# Patient Record
Sex: MALE | Race: WHITE | URBAN - METROPOLITAN AREA
[De-identification: names, ages, dates, MRNs, and addresses within clinical notes are randomized per-mention and may not be internally consistent; named-entity substitution may affect disease eponyms.]

---

## 2020-08-05 ENCOUNTER — EMERGENCY (EMERGENCY)
Facility: HOSPITAL | Age: 40
LOS: 1 days | Discharge: HOME | End: 2020-08-05
Attending: EMERGENCY MEDICINE
Payer: COMMERCIAL

## 2020-08-05 VITALS
TEMPERATURE: 97 F | DIASTOLIC BLOOD PRESSURE: 80 MMHG | WEIGHT: 184.97 LBS | OXYGEN SATURATION: 97 % | HEART RATE: 65 BPM | HEIGHT: 71 IN | SYSTOLIC BLOOD PRESSURE: 131 MMHG | RESPIRATION RATE: 18 BRPM

## 2020-08-05 PROCEDURE — 99283 EMERGENCY DEPT VISIT LOW MDM: CPT | Mod: 25

## 2020-08-05 PROCEDURE — 12011 RPR F/E/E/N/L/M 2.5 CM/<: CPT

## 2020-08-05 RX ORDER — CYCLOBENZAPRINE HYDROCHLORIDE 10 MG/1
1 TABLET, FILM COATED ORAL
Qty: 15 | Refills: 0
Start: 2020-08-05 | End: 2020-08-09

## 2020-08-05 RX ORDER — IBUPROFEN 200 MG
1 TABLET ORAL
Qty: 21 | Refills: 0
Start: 2020-08-05 | End: 2020-08-11

## 2020-08-05 RX ORDER — IBUPROFEN 200 MG
600 TABLET ORAL ONCE
Refills: 0 | Status: COMPLETED | OUTPATIENT
Start: 2020-08-05 | End: 2020-08-05

## 2020-08-05 RX ADMIN — Medication 600 MILLIGRAM(S): at 18:38

## 2020-08-05 RX ADMIN — Medication 600 MILLIGRAM(S): at 18:00

## 2020-08-05 NOTE — ED ADULT TRIAGE NOTE - PATIENT ON (OXYGEN DELIVERY METHOD)
room air Island Pedicle Flap With Canthal Suspension Text: The defect edges were debeveled with a #15 scalpel blade.  Given the location of the defect, shape of the defect and the proximity to free margins an island pedicle advancement flap was deemed most appropriate.  Using a sterile surgical marker, an appropriate advancement flap was drawn incorporating the defect, outlining the appropriate donor tissue and placing the expected incisions within the relaxed skin tension lines where possible. The area thus outlined was incised deep to adipose tissue with a #15 scalpel blade.  The skin margins were undermined to an appropriate distance in all directions around the primary defect and laterally outward around the island pedicle utilizing iris scissors.  There was minimal undermining beneath the pedicle flap. A suspension suture was placed in the canthal tendon to prevent tension and prevent ectropion.

## 2020-08-05 NOTE — ED PROVIDER NOTE - ATTENDING CONTRIBUTION TO CARE
pt comes in c/o laceration to right face and lower back pain   1 cm laceration actively bleeding   + paraspinal tenderness , no midline   no saddle anesthesia     laceration repair and    analgesia

## 2020-08-05 NOTE — ED PROVIDER NOTE - PHYSICAL EXAMINATION
R malar area 1cm linear laceration actively bleeding. No foreign body noted. Lower back has no midline tenderness and no stepoffs. +Paraspinal tenderness b/l.
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
## 2020-08-05 NOTE — ED PROVIDER NOTE - PATIENT PORTAL LINK FT
You can access the FollowMyHealth Patient Portal offered by [facility] by registering at the following website: [email or web address]. By joining [employer]’s FollowMyHealth portal, you will also be able to view your health information using other applications (apps) compatible with our system.

## 2020-08-05 NOTE — ED PROVIDER NOTE - CLINICAL SUMMARY MEDICAL DECISION MAKING FREE TEXT BOX
Tetanus UTD. Will suture and will give analgesia for back pain, return in 5 days for suture removal.
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
## 2020-08-05 NOTE — ED PROVIDER NOTE - PROGRESS NOTE DETAILS
Will return in 5 days for suture removal. Pt is well appearing walking with steady gait, stable for discharge and follow up without fail with medical doctor. I had a detailed discussion with the patient and/or guardian regarding the historical points, exam findings, and any diagnostic results supporting the discharge diagnosis. Pt educated on care and need for follow up. Strict return instructions and red flag signs and symptoms discussed with patient. Questions answered. Pt shows understanding of discharge information and agrees to follow.
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
## 2021-10-28 ENCOUNTER — EMERGENCY (EMERGENCY)
Facility: HOSPITAL | Age: 41
LOS: 1 days | Discharge: HOME | End: 2021-10-28
Attending: EMERGENCY MEDICINE
Payer: COMMERCIAL

## 2021-10-28 VITALS
OXYGEN SATURATION: 98 % | HEIGHT: 71 IN | SYSTOLIC BLOOD PRESSURE: 134 MMHG | DIASTOLIC BLOOD PRESSURE: 82 MMHG | HEART RATE: 68 BPM | RESPIRATION RATE: 18 BRPM | TEMPERATURE: 98 F

## 2021-10-28 PROCEDURE — 73630 X-RAY EXAM OF FOOT: CPT | Mod: 26,RT

## 2021-10-28 PROCEDURE — 99284 EMERGENCY DEPT VISIT MOD MDM: CPT | Mod: 25

## 2021-10-28 PROCEDURE — 73030 X-RAY EXAM OF SHOULDER: CPT

## 2021-10-28 PROCEDURE — 99284 EMERGENCY DEPT VISIT MOD MDM: CPT

## 2021-10-28 PROCEDURE — 73030 X-RAY EXAM OF SHOULDER: CPT | Mod: 26,RT

## 2021-10-28 PROCEDURE — 73630 X-RAY EXAM OF FOOT: CPT

## 2021-10-28 RX ORDER — IBUPROFEN 200 MG
1 TABLET ORAL
Qty: 28 | Refills: 0
Start: 2021-10-28 | End: 2021-11-03

## 2021-10-28 NOTE — ED PROVIDER NOTE - NS ED ROS FT
CONSTITUTIONAL: No weakness, fevers or chills  EYES/ENT: No visual changes;  No vertigo or throat pain   NECK: No pain or stiffness  RESPIRATORY: No cough, wheezing, hemoptysis; No shortness of breath  CARDIOVASCULAR: No chest pain or palpitations  GASTROINTESTINAL: No abdominal or epigastric pain. No nausea, vomiting, or hematemesis; No diarrhea or constipation. No melena or hematochezia.  GENITOURINARY: No dysuria, frequency or hematuria  NEUROLOGICAL: No numbness or weakness  SKIN: No itching, rashes  MSK: R shoulder pain, R 2nd toe pain

## 2021-10-28 NOTE — ED PROVIDER NOTE - CARE PLAN
Principal Discharge DX:	Fracture in accidental fall  Secondary Diagnosis:	Shoulder pain, right  Secondary Diagnosis:	Pain, joint, foot, right   1

## 2021-10-28 NOTE — ED ADULT NURSE NOTE - OBJECTIVE STATEMENT
pt BIBEMS c/o of Rt shoulder and Rt foot pain s/p slip and fall today at work, denies any head injury

## 2021-10-28 NOTE — ED PROVIDER NOTE - PHYSICAL EXAMINATION
Vital Signs Last 24 Hrs  T(C): 36.8 (10-28-21 @ 19:27)  T(F): 98.2 (10-28-21 @ 19:27), Max: 98.2 (10-28-21 @ 19:27)  HR: 68 (10-28-21 @ 19:27) (68 - 68)  BP: 134/82 (10-28-21 @ 19:27)  BP(mean): --  RR: 18 (10-28-21 @ 19:27) (18 - 18)  SpO2: 98% (10-28-21 @ 19:27) (98% - 98%)  Wt(kg): --    Constitutional: NAD, awake and alert  EYES: EOMI  ENT:  Normal Hearing, no tonsillar exudates   Neck: Soft and supple , no thyromegaly   Respiratory: Breath sounds are clear bilaterally, No wheezing, rales or rhonchi  Cardiovascular: S1 and S2, regular rate and rhythm, no Murmurs, gallops or rubs, no JVD,    Gastrointestinal: Bowel Sounds present, soft, nontender, nondistended, no guarding, no rebound  Extremities: No cyanosis or clubbing; warm to touch  Vascular: 2+ peripheral pulses lower ex  Neurological: No focal deficits, CN II-XII intact bilaterally, sensation to light touch intact in all extremities, gait intact. Pupils are equally reactive to light and symmetrical in size.   Musculoskeletal: R shoulder: no pain to palpation of R shoulder, pain with active abduction of shoulder from 0-15 degrees; + empty coke can test; no tenderness on passive ROM of R shoulder; sensation intact in distribution of axillary nerve, radial nerve, ulnar nerve, median nerve; 5/5 str making fist, thumbs up, OK sign; RLE: no pain with inversion/eversion/dorsiflexion/plantarflexion; exquisitely tender in middle phalanx of 2nd toe with mild swelling  Skin: No rashes  Psych: no depression or anhedonia, AAOx3  HEME: no bruises, no nose bleeds
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
## 2021-10-28 NOTE — ED PROVIDER NOTE - PATIENT PORTAL LINK FT
You can access the FollowMyHealth Patient Portal offered by [facility] by registering at the following website: [email or web address]. By joining [employer]’s FollowMyHealth portal, you will also be able to view your health information using other applications (apps) compatible with our system.

## 2021-10-28 NOTE — ED PROVIDER NOTE - OBJECTIVE STATEMENT
41M, no pertinent medical histtory, presents s/p fall on oil slick 2 hours pta. Pt was working job as  and slipped on oil slick and slid down a wall, hyperextending his R shoulder. Pt now endorses R shoulder pain (diffuse, worse with movement, nonradiating, 6/10 severity) and R 2nd toe pain (middle phalanx, worse with palpation and movement, nonradiating, 7/10 severity). Pt denies head trauma, LOC, any other msk pain, SOB, chest pain, nausea, vomiting. Pt able to ambulate but favors R foot due to toe pain.

## 2022-01-25 ENCOUNTER — EMERGENCY (EMERGENCY)
Facility: HOSPITAL | Age: 42
LOS: 1 days | Discharge: HOME | End: 2022-01-25
Attending: EMERGENCY MEDICINE
Payer: SELF-PAY

## 2022-01-25 VITALS
SYSTOLIC BLOOD PRESSURE: 143 MMHG | HEART RATE: 70 BPM | DIASTOLIC BLOOD PRESSURE: 89 MMHG | RESPIRATION RATE: 18 BRPM | TEMPERATURE: 99 F | WEIGHT: 179.9 LBS | HEIGHT: 71 IN | OXYGEN SATURATION: 97 %

## 2022-01-25 VITALS
SYSTOLIC BLOOD PRESSURE: 141 MMHG | DIASTOLIC BLOOD PRESSURE: 66 MMHG | TEMPERATURE: 98 F | OXYGEN SATURATION: 98 % | HEART RATE: 75 BPM | RESPIRATION RATE: 18 BRPM

## 2022-01-25 PROCEDURE — 70450 CT HEAD/BRAIN W/O DYE: CPT | Mod: MA

## 2022-01-25 PROCEDURE — 99284 EMERGENCY DEPT VISIT MOD MDM: CPT | Mod: 25

## 2022-01-25 PROCEDURE — 90471 IMMUNIZATION ADMIN: CPT

## 2022-01-25 PROCEDURE — 70450 CT HEAD/BRAIN W/O DYE: CPT | Mod: 26,MA

## 2022-01-25 PROCEDURE — 12013 RPR F/E/E/N/L/M 2.6-5.0 CM: CPT

## 2022-01-25 PROCEDURE — 12052 INTMD RPR FACE/MM 2.6-5.0 CM: CPT

## 2022-01-25 PROCEDURE — 90715 TDAP VACCINE 7 YRS/> IM: CPT

## 2022-01-25 RX ORDER — IBUPROFEN 200 MG
600 TABLET ORAL ONCE
Refills: 0 | Status: COMPLETED | OUTPATIENT
Start: 2022-01-25 | End: 2022-01-25

## 2022-01-25 RX ORDER — TETANUS TOXOID, REDUCED DIPHTHERIA TOXOID AND ACELLULAR PERTUSSIS VACCINE, ADSORBED 5; 2.5; 8; 8; 2.5 [IU]/.5ML; [IU]/.5ML; UG/.5ML; UG/.5ML; UG/.5ML
0.5 SUSPENSION INTRAMUSCULAR ONCE
Refills: 0 | Status: COMPLETED | OUTPATIENT
Start: 2022-01-25 | End: 2022-01-25

## 2022-01-25 RX ORDER — METHOCARBAMOL 500 MG/1
1500 TABLET, FILM COATED ORAL ONCE
Refills: 0 | Status: COMPLETED | OUTPATIENT
Start: 2022-01-25 | End: 2022-01-25

## 2022-01-25 RX ADMIN — METHOCARBAMOL 1500 MILLIGRAM(S): 500 TABLET, FILM COATED ORAL at 23:38

## 2022-01-25 RX ADMIN — Medication 600 MILLIGRAM(S): at 23:38

## 2022-01-25 RX ADMIN — TETANUS TOXOID, REDUCED DIPHTHERIA TOXOID AND ACELLULAR PERTUSSIS VACCINE, ADSORBED 0.5 MILLILITER(S): 5; 2.5; 8; 8; 2.5 SUSPENSION INTRAMUSCULAR at 23:36

## 2022-01-25 RX ADMIN — Medication 600 MILLIGRAM(S): at 23:51

## 2022-01-25 NOTE — ED PROVIDER NOTE - NSFOLLOWUPCLINICS_GEN_ALL_ED_FT
[facility]  Internal Medicine  [address] [place] [address]  Phone: [phone]  Fax: [phone]

## 2022-01-25 NOTE — ED ADULT NURSE NOTE - OBJECTIVE STATEMENT
slipped and fall tonight with laceration to head c collar.pain scale 9/10 medication given as ordered

## 2022-01-25 NOTE — ED PROVIDER NOTE - CLINICAL SUMMARY MEDICAL DECISION MAKING FREE TEXT BOX
Pt is neurovascularly intact s/p staple placement of laceration.   Laceration is well approximated, pt, aware of potential for scar formation. Pt is neurovascularly intact s/p staple placement of laceration.   Laceration is well approximated, pt, aware of potential for scar formation.  CT head reported: No intracranial hemorrhage, mass effect or depressed skull fracture.  Right frontal scalp hematoma with overlying skin staples.    Pt is well appearing, has no new complaints and able to walk with normal gait. Pt is stable for discharge and follow up with medical doctor. Pt educated on care and need for follow up. Discussed anticipatory guidance and return precautions. Questions answered. I had a detailed discussion with the patient and/or guardian regarding the historical points, exam findings, and any diagnostic results supporting the discharge diagnosis.

## 2022-01-25 NOTE — ED PROVIDER NOTE - OBJECTIVE STATEMENT
Pt is [employer] states slipped on ice on ramp and struck forehead on brick wall. Pt sustained 3 cm right forehead laceration. above hairline.  No LOC, states feels dizzily, No midline neck pain. Pt is not on anticoagulants.

## 2022-01-25 NOTE — ED PROVIDER NOTE - PHYSICAL EXAMINATION
3cm horizontal laceration  to right forehead above hair line.  GCS 15, no raccoon eyes, no Battles sign, no scalp step off deformities.  No cervical, thoracic or lumbosacral midline bony deformities,  +rotation and flexion-extension of neck and truncal area intact.

## 2022-01-25 NOTE — ED PROVIDER NOTE - SKIN [+], MLM
Noted pt still on OR scheduling. 2nd message left for [facility] - assistant and surgery scheduler for Dr. Maya Olson - that pt is cancelled (see previous RN note on 1/4/19). Requested office update OR scheduling and number given. LACERATION

## 2022-01-25 NOTE — ED PROVIDER NOTE - PATIENT PORTAL LINK FT
You can access the FollowMyHealth Patient Portal offered by [facility] by registering at the following website: [email or web address]. By joining [employer]’s FollowMyHealth portal, you will also be able to view your health information using other applications (apps) compatible with our system.

## 2022-01-25 NOTE — ED PROVIDER NOTE - NSFOLLOWUPINSTRUCTIONS_ED_ALL_ED_FT
Staple removal in 1 week.  Return if you have pain, bleeding, headache, any concerns.  See your doctor as soon as possible (within 1-2 days).   If you need further assistance for appointments you can contact the [place] Care Coordinator at [phone] or the [facility] Patient Access Services helpline at [phone] to find names/contact #s for a practitioner to follow up with.  Bring your discharge papers / test results with you to any follow up appointments.   In addition our outpatient [facility] is located at [address] [place] [address], tel: [phone]. Staple removal in 1 week.  Take Motrin 600mg every 6 hours for pain with food.   Return if you have pain, bleeding, headache, any concerns.  See your doctor as soon as possible (within 1-2 days).   If you need further assistance for appointments you can contact the [place] Care Coordinator at [phone] or the [facility] Patient Access Services helpline at [phone] to find names/contact #s for a practitioner to follow up with.  Bring your discharge papers / test results with you to any follow up appointments.   In addition our outpatient [facility] is located at [address] [place] [address], tel: [phone].

## 2022-01-25 NOTE — ED ADULT TRIAGE NOTE - CHIEF COMPLAINT QUOTE
slipped and fall tonight with laceration to head no loc,no dizzinness,no vomiting,,no blood thinner,with c collar

## 2023-01-23 ENCOUNTER — EMERGENCY (EMERGENCY)
Facility: HOSPITAL | Age: 43
LOS: 1 days | Discharge: HOME | End: 2023-01-23
Attending: EMERGENCY MEDICINE
Payer: COMMERCIAL

## 2023-01-23 VITALS
DIASTOLIC BLOOD PRESSURE: 75 MMHG | HEART RATE: 65 BPM | OXYGEN SATURATION: 98 % | SYSTOLIC BLOOD PRESSURE: 130 MMHG | RESPIRATION RATE: 19 BRPM | TEMPERATURE: 98 F | HEIGHT: 71 IN | WEIGHT: 179.9 LBS

## 2023-01-23 PROCEDURE — 99284 EMERGENCY DEPT VISIT MOD MDM: CPT | Mod: 25

## 2023-01-23 PROCEDURE — 99284 EMERGENCY DEPT VISIT MOD MDM: CPT

## 2023-01-23 PROCEDURE — 72125 CT NECK SPINE W/O DYE: CPT | Mod: MA

## 2023-01-23 PROCEDURE — 72125 CT NECK SPINE W/O DYE: CPT | Mod: 26,MA

## 2023-01-23 RX ORDER — IBUPROFEN 200 MG
600 TABLET ORAL ONCE
Refills: 0 | Status: COMPLETED | OUTPATIENT
Start: 2023-01-23 | End: 2023-01-23

## 2023-01-23 RX ORDER — IBUPROFEN 200 MG
1 TABLET ORAL
Qty: 30 | Refills: 0
Start: 2023-01-23

## 2023-01-23 RX ADMIN — Medication 600 MILLIGRAM(S): at 16:57

## 2023-01-23 NOTE — ED PROVIDER NOTE - CLINICAL SUMMARY MEDICAL DECISION MAKING FREE TEXT BOX
Patient with long left lower back pain and also midline cervical spinal tenderness to palpation.  We will do CT scan to evaluate for fracture.  Patient instructed to follow-up with pulm Dr. And to take ibuprofen for the next few days and if pain continues he might need an MRI.  Doubt cord compression in the cervical spine given normal neuro exam
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
## 2023-01-23 NOTE — ED PROVIDER NOTE - NSFOLLOWUPINSTRUCTIONS_ED_ALL_ED_FT
Cervical Sprain      A cervical sprain is a stretch or tear in one or more of the ligaments in the neck. Ligaments are the tissues that connect bones. Cervical sprains can range from mild to severe. Severe cervical sprains can cause the spinal bones (vertebrae) in the neck to be unstable. This can result in spinal cord damage and in serious nervous system problems.    The time that it takes for a cervical sprain to heal depends on the cause and extent of the injury. Most cervical sprains heal in 4–6 weeks.      What are the causes?    Cervical sprains may be caused by trauma, such as an injury from a motor vehicle accident, a fall, or a sudden forward and backward whipping movement of the head and neck (whiplash injury). Mild cervical sprains may be caused by wear and tear over time.      What increases the risk?    The following factors may make you more likely to develop this condition:  •Participating in activities that have a high risk of trauma to the neck. These include contact sports, auto racing, gymnastics, and diving.      •Taking risks when driving or riding in a motor vehicle.      •Osteoarthritis of the spine.      •Poor strength and flexibility of the neck.      •A previous neck injury.      •Poor posture.      •Spending long periods in certain positions that put stress on the neck, such as sitting at a computer for a long time.        What are the signs or symptoms?    Symptoms of this condition include:  •Pain, soreness, stiffness, tenderness, swelling, or a burning sensation in the front, back, or sides of the neck, shoulders, or upper back.      •Sudden tightening of neck muscles (spasms).      •Limited ability to move the neck.      •Headache.      •Dizziness.      •Nausea or vomiting.      •Weakness, numbness, or tingling in a hand or an arm.      Symptoms may develop right away after injury, or they may develop over a few days. In some cases, symptoms may go away with treatment and return (recur) over time.      How is this diagnosed?    This condition may be diagnosed based on:  •Your medical history.      •Your symptoms.      •Any recent injuries or known neck problems that you have, such as arthritis in the neck.      •A physical exam.      •Imaging tests, such as X-rays, MRI, and CT scan.        How is this treated?    This condition is treated by resting and icing the injured area and doing physical therapy exercises. Heat therapy may be used 2–3 days after the injury occurred if there is no swelling. Depending on the severity of your condition, treatment may also include:•Keeping your neck in place (immobilized) for periods of time. This may be done using:  •A cervical collar. This supports your chin and the back of your head.      •A cervical traction device. This is a sling that holds up your head. The device removes weight and pressure from your neck, and it may help to relieve pain.        •Medicines that help to relieve pain and inflammation.      •Medicines that help to relax your muscles (muscle relaxants).      •Surgery. This is rare.        Follow these instructions at home:      Medicines      •Take over-the-counter and prescription medicines only as told by your health care provider.    •Ask your health care provider if the medicine prescribed to you:  •Requires you to avoid driving or using heavy machinery.    •Can cause constipation. You may need to take these actions to prevent or treat constipation:  •Drink enough fluid to keep your urine pale yellow.      •Take over-the-counter or prescription medicines.      •Eat foods that are high in fiber, such as beans, whole grains, and fresh fruits and vegetables.      •Limit foods that are high in fat and processed sugars, such as fried or sweet foods.          If you have a cervical collar:     •Wear the collar as told by your health care provider. Do not remove it unless told.      •Ask before making any adjustments to your collar.      •If you have long hair, keep it outside of the collar.    •Ask your health care provider if you may remove the collar for cleaning and bathing. If so:  •Follow instructions about how to remove it safely.      •Clean it by hand with mild soap and water and air-dry it completely.      •If your collar has removable pads, remove them every 1–2 days and wash them by hand with soap and water. Let them air-dry completely before putting them back in the collar.        •Tell your health care provider if your skin under the collar has irritation or sores.        Managing pain, stiffness, and swelling                   •If directed, use a cervical traction device as told.    •If directed, put ice on the affected area. To do this:  •Put ice in a plastic bag.      •Place a towel between your skin and the bag.      •Leave the ice on for 20 minutes, 2–3 times a day.      •If directed, apply heat to the affected area before you do your physical therapy or as often as told by your health care provider. Use the heat source that your health care provider recommends, such as a moist heat pack or a heating pad.  •Place a towel between your skin and the heat source.      •Leave the heat on for 20–30 minutes.      •Remove the heat if your skin turns bright red. This is especially important if you are unable to feel pain, heat, or cold. You may have a greater risk of getting burned.        Activity     • Do not drive while wearing a cervical collar. If you do not have a cervical collar, ask if it is safe to drive while your neck heals.      • Do not lift anything that is heavier than 10 lb (4.5 kg), or the limit that you are told, until your health care provider says that it is safe.      •Rest as told by your health care provider.      •If physical therapy was prescribed, do exercises as told by your health care provider or physical therapist.      •Return to your normal activities as told by your health care provider. Avoid positions and activities that make your symptoms worse. Ask your health care provider what activities are safe for you.      General instructions     • Do not use any products that contain nicotine or tobacco, such as cigarettes, e-cigarettes, and chewing tobacco. These can delay healing. If you need help quitting, ask your health care provider.      •Keep all follow-up visits as told by your health care provider or physical therapist. This is important.        How is this prevented?    To prevent a cervical sprain from happening again:  •Use and maintain good posture. Make any needed adjustments to your workstation to help you do this.      •Exercise regularly as told by your health care provider or physical therapist.      •Avoid risky activities that may cause a cervical sprain.        Contact a health care provider if you have:    •Symptoms that get worse or do not get better after 2 weeks of treatment.      •Pain that gets worse or does not get better with medicine.      •New, unexplained symptoms.      •Sores or irritated skin on your neck from wearing your cervical collar.        Get help right away if:    •You have severe pain.      •You develop numbness, tingling, or weakness in any part of your body.      •You cannot move a part of your body (you have paralysis).      •You have neck pain along with severe dizziness or headache.        Summary    •A cervical sprain is a stretch or tear in one or more of the ligaments in the neck.      •Cervical sprains may be caused by trauma, such as an injury from a motor vehicle accident, a fall, or a sudden forward and backward whipping movement of the head and neck (whiplash injury).      •Symptoms may develop right away after injury, or they may develop over a few days.      •This condition may be treated with rest, ice, heat, medicines, physical therapy, and surgery.      This information is not intended to replace advice given to you by your health care provider. Make sure you discuss any questions you have with your health care provider.

## 2023-01-23 NOTE — ED PROVIDER NOTE - PHYSICAL EXAMINATION
Tenderness to palpation over C6-C7 no paraspinal tenderness to Palpation.  No tenderness to palpation to the remainder of the spine.  Left lower back tenderness to palpation.  Strength sensation intact in both arms and legs ambulatory steady gait awake alert well-appearing

## 2023-01-23 NOTE — ED PROVIDER NOTE - PATIENT PORTAL LINK FT
You can access the FollowMyHealth Patient Portal offered by [facility] by registering at the following website: [email or web address]. By joining [employer]’s FollowMyHealth portal, you will also be able to view your health information using other applications (apps) compatible with our system.

## 2024-04-17 NOTE — ED ADULT NURSE NOTE - NS_NURSE_DISC_TEACHING_YN_ED_ALL_ED
Yes Initial Decision For Surgery?: Yes Risk Assessment Explanation (Does Not Render In The Note): Clinical determination of the probability and/or consequences of an event, such as surgery. Clinical assessment of the level of risk is affected by the nature of the event under consideration for the patient. Modifier 57 is used to indicate an Evaluation and Management (E/M) service resulted in the initial decision to perform surgery either the day before a major surgery (90 day global) or the day of a major surgery. Discussion: Risks, benefits, and alternatives to treatment were discussed with patient as outlined below. Complexity (Necessary For Coding; Major - 90 Day Global With Some Exceptions; Minor - 10 Day Global): major Date Of Surgery - Today Or Tomorrow?: today

## 2024-06-03 NOTE — ED PROVIDER NOTE - DISPOSITION TYPE
Airway    Performed by: Rambo Alberto MD  Authorized by: Rambo Alberto MD    Final Airway Type:  Endotracheal airway  Final Endotracheal Airway*:  ETT  ETT Size (mm)*:  7.5  Cuff*:  Regular  Technique Used for Successful ETT Placement:  Video laryngoscopy  Devices/Methods Used in Placement*:  Mask  Intubation Procedure*:  ETCO2, Preoxygenation, Atraumatic, Dentition Unchanged and Pharynx Clear  Insertion Site:  Oral  Blade Type*:  Video Laryngoscope  Blade size: D.  Initial Cuff Pressure (cm H2O):  5  Cuff Volume (mL):  5  Placement Verified by: auscultation, capnometry and palpation of cuff    Glottic View*:  1 - full view of glottis  Attempts*:  1  Number of Other Approaches Attempted:  0   Patient Identified, Procedure confirmed, Emergency equipment available and Safety protocols followed  Location:  OR  Urgency:  Elective  Difficult Airway: No    Indications for Airway Management:  Anesthesia  Spontaneous Ventilation: present    Sedation Level:  Deep  Manual In-line Stabilization Maintained Throughout: Yes    Mask Difficulty Assessment:  1 - vent by mask  Start Time: 6/3/2024 4:07 PM       DISCHARGE

## 2025-05-01 NOTE — ED ADULT NURSE NOTE - NS ED NURSE LEVEL OF CONSCIOUSNESS AFFECT
Spoke with Dr Allison who said pt will only need a medical clearance   Appt moisés Kwong cancelled  Dr Machuca office will call for an appt   Pt and provider updated   
Calm